# Patient Record
Sex: MALE | Race: WHITE | NOT HISPANIC OR LATINO | Employment: FULL TIME | URBAN - METROPOLITAN AREA
[De-identification: names, ages, dates, MRNs, and addresses within clinical notes are randomized per-mention and may not be internally consistent; named-entity substitution may affect disease eponyms.]

---

## 2022-07-29 ENCOUNTER — LAB REQUISITION (OUTPATIENT)
Dept: LAB | Facility: HOSPITAL | Age: 32
End: 2022-07-29

## 2022-07-29 DIAGNOSIS — Z00.00 ENCOUNTER FOR GENERAL ADULT MEDICAL EXAMINATION WITHOUT ABNORMAL FINDINGS: ICD-10-CM

## 2022-07-29 PROCEDURE — 87070 CULTURE OTHR SPECIMN AEROBIC: CPT | Performed by: OTOLARYNGOLOGY

## 2022-07-29 PROCEDURE — 87076 CULTURE ANAEROBE IDENT EACH: CPT | Performed by: OTOLARYNGOLOGY

## 2022-07-29 PROCEDURE — 87185 SC STD ENZYME DETCJ PER NZM: CPT | Performed by: OTOLARYNGOLOGY

## 2022-07-29 PROCEDURE — 87205 SMEAR GRAM STAIN: CPT | Performed by: OTOLARYNGOLOGY

## 2022-07-31 LAB
BACTERIA SPEC AEROBE CULT: ABNORMAL
BACTERIA SPEC AEROBE CULT: ABNORMAL
GRAM STN SPEC: ABNORMAL
GRAM STN SPEC: ABNORMAL

## 2022-08-29 ENCOUNTER — LAB REQUISITION (OUTPATIENT)
Dept: LAB | Facility: HOSPITAL | Age: 32
End: 2022-08-29

## 2022-08-29 DIAGNOSIS — H66.3X1 OTHER CHRONIC SUPPURATIVE OTITIS MEDIA, RIGHT EAR: ICD-10-CM

## 2022-08-29 PROCEDURE — 87070 CULTURE OTHR SPECIMN AEROBIC: CPT | Performed by: OTOLARYNGOLOGY

## 2022-08-29 PROCEDURE — 87205 SMEAR GRAM STAIN: CPT | Performed by: OTOLARYNGOLOGY

## 2022-08-31 LAB
BACTERIA SPEC AEROBE CULT: NORMAL
GRAM STN SPEC: NORMAL

## 2023-11-22 ENCOUNTER — LAB REQUISITION (OUTPATIENT)
Dept: LAB | Facility: HOSPITAL | Age: 33
End: 2023-11-22
Payer: COMMERCIAL

## 2023-11-22 DIAGNOSIS — H66.3X1 OTHER CHRONIC SUPPURATIVE OTITIS MEDIA, RIGHT EAR: ICD-10-CM

## 2023-11-22 PROCEDURE — 87070 CULTURE OTHR SPECIMN AEROBIC: CPT | Performed by: OTOLARYNGOLOGY

## 2023-11-22 PROCEDURE — 87205 SMEAR GRAM STAIN: CPT | Performed by: OTOLARYNGOLOGY

## 2023-11-25 LAB
BACTERIA SPEC AEROBE CULT: NORMAL
GRAM STN SPEC: NORMAL
GRAM STN SPEC: NORMAL

## 2024-03-05 ENCOUNTER — OFFICE VISIT (OUTPATIENT)
Dept: ORTHOPEDIC SURGERY | Facility: CLINIC | Age: 34
End: 2024-03-05
Payer: COMMERCIAL

## 2024-03-05 VITALS
HEART RATE: 88 BPM | HEIGHT: 74 IN | SYSTOLIC BLOOD PRESSURE: 145 MMHG | BODY MASS INDEX: 33.37 KG/M2 | WEIGHT: 260 LBS | DIASTOLIC BLOOD PRESSURE: 88 MMHG

## 2024-03-05 DIAGNOSIS — S46.212A RUPTURE OF LEFT DISTAL BICEPS TENDON, INITIAL ENCOUNTER: Primary | ICD-10-CM

## 2024-03-05 PROCEDURE — 99203 OFFICE O/P NEW LOW 30 MIN: CPT | Performed by: NURSE PRACTITIONER

## 2024-03-05 NOTE — PROGRESS NOTES
"Subjective:     Patient ID: Sebastian Brito is a 33 y.o. male.    Chief Complaint:  Left elbow injury, new patient to examiner  History of Present Illness  Sebastian Brito is a 33-year-old male who presents to clinic today for evaluation of his left upper extremity.    He is right hand dominant, but he was moving items in his storage over the weekend when he applied pressure to the forearm, tried to lift up, felt a pop, and noticed the biceps muscle retracted proximally. He rates his discomfort as a 7 out of a 10, throbbing, and shooting in nature. He did present to urgent care. He is currently utilizing a sling, has taken some Tylenol as well as use of ice. He denies any prior injury to the left upper extremity in the past. 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 He is also experiencing some pain in his shoulder, which was present prior to the biceps. He denies any other concerns present.       Social History     Occupational History    Not on file   Tobacco Use    Smoking status: Never     Passive exposure: Past    Smokeless tobacco: Current     Types: Chew   Vaping Use    Vaping status: Never Used   Substance and Sexual Activity    Alcohol use: Yes     Comment: occ    Drug use: Never    Sexual activity: Yes      History reviewed. No pertinent past medical history.  History reviewed. No pertinent surgical history.    History reviewed. No pertinent family history.            Objective:  Physical Exam    Vital signs reviewed.   General: No acute distress.  Eyes: conjunctiva clear; pupils equally round and reactive  ENT: external ears and nose atraumatic; oropharynx clear  CV: no peripheral edema  Resp: normal respiratory effort  Skin: no rashes or wounds; normal turgor  Psych: mood and affect appropriate; recent and remote memory intact    Vitals:    03/05/24 1112   BP: 145/88   Pulse: 88   Weight: 118 kg (260 lb)   Height: 188 cm (74\")         03/05/24  1112   Weight: 118 kg (260 lb)     Body mass " index is 33.38 kg/m².      Left Elbow Exam     Comments:  Positive hook test left upper extremity  Positive sensation light touch all distributions left upper extremity  Negative Tinel's at cubital tunnel  Flex/extend all digits left hand  2+ distal radius pulse   strength 5 out of 5                 Imaging:        Independently reviewed 2 view x-ray imaging right elbow osteophyte triceps tendon insertion no evidence of acute fracture dislocation or other acute osseous abnormality    Assessment:        1. Rupture of left distal biceps tendon, initial encounter           Plan:  1. We discussed plan of care with the patient. We will proceed with MRI for surgical planning. We will plan to call with results and further plan of care. He will continue with sling, continue with over-the-counter analgesics, continue with ice. He will continue with finger range of motion. He was encouraged to call with any questions or concerns. All questions answered.  Orders:  Orders Placed This Encounter   Procedures    MRI Elbow Left Without Contrast     No orders of the defined types were placed in this encounter.          I ordered and reviewed the DONYA today.       Dragon dictation utilized    Transcribed from ambient dictation for KAMILA Vega by Yesys Vasquez.  03/05/24   11:46 EST    Patient or patient representative verbalized consent to the visit recording.  I have personally performed the services described in this document as transcribed by the above individual, and it is both accurate and complete.

## 2024-03-14 ENCOUNTER — TELEPHONE (OUTPATIENT)
Dept: ORTHOPEDIC SURGERY | Facility: CLINIC | Age: 34
End: 2024-03-14
Payer: COMMERCIAL

## 2024-03-18 ENCOUNTER — OFFICE VISIT (OUTPATIENT)
Dept: ORTHOPEDIC SURGERY | Facility: CLINIC | Age: 34
End: 2024-03-18
Payer: COMMERCIAL

## 2024-03-18 VITALS
HEART RATE: 91 BPM | WEIGHT: 260 LBS | HEIGHT: 74 IN | DIASTOLIC BLOOD PRESSURE: 86 MMHG | SYSTOLIC BLOOD PRESSURE: 131 MMHG | BODY MASS INDEX: 33.37 KG/M2

## 2024-03-18 DIAGNOSIS — S46.212A RUPTURE OF LEFT DISTAL BICEPS TENDON, INITIAL ENCOUNTER: Primary | ICD-10-CM

## 2024-03-18 PROCEDURE — 99214 OFFICE O/P EST MOD 30 MIN: CPT | Performed by: ORTHOPAEDIC SURGERY

## 2024-03-18 NOTE — PROGRESS NOTES
Subjective:     Patient ID: Sebastian Brito is a 33 y.o. male.    Chief Complaint:  Left elbow pain, new patient to examiner     History of Present Illness  Sebastian Brito presents for evaluation of left elbow pain. He is accompanied by an adult female.     This occurred on 03/03/2024 when he was moving some items out of a storage facility, felt a sharp pain to his anterior upper extremity and forearm on the left side, noted a pop, and felt some retraction of his biceps muscle on that side. He was seen by KAMILA Arellano. An MRI was ordered. He was noted to have complete distal biceps rupture. His pain has been fairly moderate in intensity since then, which he rates as a 6 to 7 out of 10, aching in nature, occasional sharp pain. He does note associated weakness with rotational activities as well as bending his elbow. He has been using a sling to help with pain control as well as stability. He denies any fevers, chills, or sweats. He denies any prior issues with his elbow. He has had some intermittent tingling and numbness extending down into his lateral forearm. He has no weakness in his hands.       Social History     Occupational History    Not on file   Tobacco Use    Smoking status: Former     Types: Cigarettes     Passive exposure: Past    Smokeless tobacco: Current     Types: Chew    Tobacco comments:     Nicotine pouches - previously used dip   Vaping Use    Vaping status: Never Used   Substance and Sexual Activity    Alcohol use: Yes     Alcohol/week: 1.0 - 2.0 standard drink of alcohol     Types: 1 - 2 Shots of liquor per week     Comment: occ    Drug use: Never    Sexual activity: Yes     Partners: Female     Birth control/protection: None      History reviewed. No pertinent past medical history.  No past surgical history on file.    Family History   Problem Relation Age of Onset    Anesthesia problems Mother         sensitivity to succinylcholine         Review of Systems        Objective:  Vitals:     "03/18/24 1328   BP: 131/86   Pulse: 91   Weight: 118 kg (260 lb)   Height: 188 cm (74\")         03/18/24  1328   Weight: 118 kg (260 lb)     Body mass index is 33.38 kg/m².  Physical Exam    Vital signs reviewed.   General: No acute distress, alert and oriented  Eyes: conjunctiva clear; pupils equally round and reactive  ENT: external ears and nose atraumatic; oropharynx clear  CV: no peripheral edema  Resp: normal respiratory effort  Skin: no rashes or wounds; normal turgor  Psych: mood and affect appropriate; recent and remote memory intact        Ortho Exam     Left elbow-  Active range of motion is 5 to 110 degrees, 4 out of 5 strength on flexion, 4+ out of 5 strength on extension, 4- out of 5 strength on resisted supination, 4 plus out of 5 on resisted pronation both to 60 degrees, limited secondary to pain and swelling. Moderate swelling and ecchymosis noted in the antecubital fossa. 1+ radial pulse, left wrist. Positive sensation to light touch in all distributions, left hand symmetric to the right.  Imaging:              Review of outside MRI left elbow as well as x-ray left elbow including review of imaging as well as radiology report indicates distal biceps rupture with retraction of greater than 5 cm.    Assessment:        1. Rupture of left distal biceps tendon, initial encounter           Plan:        1. Discussed treatment options at length with patient at today's visit.   2. I discussed treatment options with the patient. Given his full thickness rupture of his distal biceps tendon, associated weakness and pain, and desire to return to rotational activities with his forearm, we discussed options. He would like to proceed with surgical treatment at this time.   3. Discussed treatment options for patient- wishes to proceed with left distal biceps repair versus reconstruction. We reviewed risks, benefits, and alternatives of surgery with risks including but not limited to neurovascular damage, bleeding, " infection, re-rupture of tendon, failure of healing of tendon, elbow contracture, loss of motion, weakness, stiffness, DVT pulmonary embolus, death, stroke, myocardial infarction, complex regional pain syndrome, and need for additional procedures. Patient understood all these, had all questions answered, and verbally consented to proceeding with surgery. We also discussed typical post-op rehab course and that it may take 6-12 months before obtaining maximal improvement. No guarantees were given in regards to the procedure.  4. The patient denies history of DVT or pulmonary embolus, denies cardiac history, and he is not diabetic.       Sebastian Brito was in agreement with plan and had all questions answered.     Orders:  No orders of the defined types were placed in this encounter.      Medications:  No orders of the defined types were placed in this encounter.      Followup:  No follow-ups on file.    Diagnoses and all orders for this visit:    1. Rupture of left distal biceps tendon, initial encounter (Primary)        Sebastian Brito  reports that he has quit smoking. His smoking use included cigarettes. He has been exposed to tobacco smoke. His smokeless tobacco use includes chew. I have educated him on the risk of diseases from using tobacco products such as cancer, COPD, and heart disease.   I spent 4 minutes counseling the patient.        Dictated utilizing Dragon dictation     Transcribed from ambient dictation for Armando Bang MD by Yessy Vasquez.  03/18/24   14:52 EDT    Patient or patient representative verbalized consent to the visit recording.  I have personally performed the services described in this document as transcribed by the above individual, and it is both accurate and complete.

## 2024-03-19 PROBLEM — S46.212A RUPTURE OF LEFT DISTAL BICEPS TENDON: Status: ACTIVE | Noted: 2024-03-18

## 2024-03-19 RX ORDER — ACETAMINOPHEN 325 MG/1
1000 TABLET ORAL ONCE
Status: CANCELLED | OUTPATIENT
Start: 2024-03-19 | End: 2024-03-19

## 2024-03-19 RX ORDER — MELOXICAM 7.5 MG/1
15 TABLET ORAL ONCE
Status: CANCELLED | OUTPATIENT
Start: 2024-03-19 | End: 2024-03-19

## 2024-03-19 RX ORDER — PREGABALIN 75 MG/1
75 CAPSULE ORAL ONCE
Status: CANCELLED | OUTPATIENT
Start: 2024-03-19 | End: 2024-03-19

## 2024-03-20 ENCOUNTER — PRE-ADMISSION TESTING (OUTPATIENT)
Dept: PREADMISSION TESTING | Facility: HOSPITAL | Age: 34
End: 2024-03-20
Payer: COMMERCIAL

## 2024-03-20 VITALS
BODY MASS INDEX: 34.91 KG/M2 | DIASTOLIC BLOOD PRESSURE: 88 MMHG | RESPIRATION RATE: 20 BRPM | WEIGHT: 272 LBS | OXYGEN SATURATION: 99 % | HEART RATE: 99 BPM | SYSTOLIC BLOOD PRESSURE: 138 MMHG | HEIGHT: 74 IN

## 2024-03-20 DIAGNOSIS — S46.212A RUPTURE OF LEFT DISTAL BICEPS TENDON, INITIAL ENCOUNTER: ICD-10-CM

## 2024-03-20 LAB
ANION GAP SERPL CALCULATED.3IONS-SCNC: 10.6 MMOL/L (ref 5–15)
APTT PPP: 28.7 SECONDS (ref 24.3–38.1)
BASOPHILS # BLD AUTO: 0.04 10*3/MM3 (ref 0–0.2)
BASOPHILS NFR BLD AUTO: 0.5 % (ref 0–1.5)
BUN SERPL-MCNC: 18 MG/DL (ref 6–20)
BUN/CREAT SERPL: 17.8 (ref 7–25)
CALCIUM SPEC-SCNC: 9.5 MG/DL (ref 8.6–10.5)
CHLORIDE SERPL-SCNC: 100 MMOL/L (ref 98–107)
CO2 SERPL-SCNC: 23.4 MMOL/L (ref 22–29)
CREAT SERPL-MCNC: 1.01 MG/DL (ref 0.76–1.27)
DEPRECATED RDW RBC AUTO: 37.4 FL (ref 37–54)
EGFRCR SERPLBLD CKD-EPI 2021: 100.1 ML/MIN/1.73
EOSINOPHIL # BLD AUTO: 0.17 10*3/MM3 (ref 0–0.4)
EOSINOPHIL NFR BLD AUTO: 2.2 % (ref 0.3–6.2)
ERYTHROCYTE [DISTWIDTH] IN BLOOD BY AUTOMATED COUNT: 12.1 % (ref 12.3–15.4)
GLUCOSE SERPL-MCNC: 102 MG/DL (ref 65–99)
HBA1C MFR BLD: 4.8 % (ref 4.8–5.6)
HCT VFR BLD AUTO: 46.8 % (ref 37.5–51)
HGB BLD-MCNC: 16.2 G/DL (ref 13–17.7)
IMM GRANULOCYTES # BLD AUTO: 0.03 10*3/MM3 (ref 0–0.05)
IMM GRANULOCYTES NFR BLD AUTO: 0.4 % (ref 0–0.5)
INR PPP: 1 (ref 0.9–1.1)
LYMPHOCYTES # BLD AUTO: 1.79 10*3/MM3 (ref 0.7–3.1)
LYMPHOCYTES NFR BLD AUTO: 22.9 % (ref 19.6–45.3)
MCH RBC QN AUTO: 29.7 PG (ref 26.6–33)
MCHC RBC AUTO-ENTMCNC: 34.6 G/DL (ref 31.5–35.7)
MCV RBC AUTO: 85.7 FL (ref 79–97)
MONOCYTES # BLD AUTO: 0.87 10*3/MM3 (ref 0.1–0.9)
MONOCYTES NFR BLD AUTO: 11.1 % (ref 5–12)
NEUTROPHILS NFR BLD AUTO: 4.92 10*3/MM3 (ref 1.7–7)
NEUTROPHILS NFR BLD AUTO: 62.9 % (ref 42.7–76)
NRBC BLD AUTO-RTO: 0 /100 WBC (ref 0–0.2)
PLATELET # BLD AUTO: 250 10*3/MM3 (ref 140–450)
PMV BLD AUTO: 9.5 FL (ref 6–12)
POTASSIUM SERPL-SCNC: 3.8 MMOL/L (ref 3.5–5.2)
PROTHROMBIN TIME: 13.2 SECONDS (ref 12.1–15)
RBC # BLD AUTO: 5.46 10*6/MM3 (ref 4.14–5.8)
SODIUM SERPL-SCNC: 134 MMOL/L (ref 136–145)
WBC NRBC COR # BLD AUTO: 7.82 10*3/MM3 (ref 3.4–10.8)

## 2024-03-20 PROCEDURE — 36415 COLL VENOUS BLD VENIPUNCTURE: CPT

## 2024-03-20 PROCEDURE — 85730 THROMBOPLASTIN TIME PARTIAL: CPT | Performed by: ORTHOPAEDIC SURGERY

## 2024-03-20 PROCEDURE — 83036 HEMOGLOBIN GLYCOSYLATED A1C: CPT | Performed by: ORTHOPAEDIC SURGERY

## 2024-03-20 PROCEDURE — 85610 PROTHROMBIN TIME: CPT | Performed by: ORTHOPAEDIC SURGERY

## 2024-03-20 PROCEDURE — 85025 COMPLETE CBC W/AUTO DIFF WBC: CPT | Performed by: ORTHOPAEDIC SURGERY

## 2024-03-20 PROCEDURE — 80048 BASIC METABOLIC PNL TOTAL CA: CPT | Performed by: ORTHOPAEDIC SURGERY

## 2024-03-20 NOTE — DISCHARGE INSTRUCTIONS
PRE-ADMISSION TESTING INSTRUCTIONS FOR ADULTS    Take these medications the morning of surgery with a small sip of water: nothing       Do not take any insulin or diabetes medications the morning of surgery.      No aspirin, advil, aleve, ibuprofen, naproxen, diet pills, decongestants, or herbal/vitamins for a week prior to surgery.       Tylenol/Acetaminophen is okay to take if needed.    General Instructions:    DO NOT EAT SOLID FOOD AFTER MIDNIGHT THE NIGHT BEFORE SURGERY. No gum, mints, or hard candy after midnight the night before surgery.  You may drink clear liquids the day of surgery up until 2 hours before your arrival time.  Clear liquids are liquids you can see through. Nothing RED in color.    Plain water    Sports drinks      Gelatin (Jell-O)  Fruit juices without pulp such as white grape juice and apple juice  Popsicles that contain no fruit or yogurt  Tea or coffee (no cream or milk added)    It is beneficial for you to have a clear drink that contains carbohydrates 2 hours before your arrival time.  We suggest a 20 ounce bottle of Gatorade or Powerade for non-diabetic patients or a 20 ounce bottle of Gatorade Zero or Powerade Zero for diabetic patients.     Patients who avoid smoking, chewing tobacco and alcohol for 4 weeks prior to surgery have a reduced risk of post-operative complications.  If at all possible, quit smoking as many days before surgery as you can.    Do not smoke, use chewing tobacco or drink alcohol the day of surgery    Bring your C-PAP/ BI-PAP machine if you use one.  Wear clean comfortable clothes.  Do not wear contact lenses, lotion, deodorant, or make-up.  Bring a case for your glasses if applicable. You may brush your teeth the morning of surgery.  You may wear dentures/partials, do not put adhesive/glue on them.  Leave all other jewelry and valuables at home.      Preventing a Surgical Site Infection:    Shower the night before and on the morning of surgery using the  chlorhexidine soap you were given.  Use a clean washcloth with the soap.  Place clean sheets on your bed after showering the night before surgery. Do not use the CHG soap on your hair, face, or private areas. Wash your body gently for five (5) minutes. Do not scrub your skin.  Dry with a clean towel and dress in clean clothing.  Do not shave the surgical area for 10 days-2 weeks prior to surgery  because the razor can irritate skin and make it easier to develop an infection.  Make sure you, your family, and all healthcare providers clean their hands with soap and water or an alcohol based hand  before caring for you or your wound.      Day of surgery:    Your surgeon’s office will advise you of your arrival time for the day of surgery.    Upon arrival, a Pre-op nurse and Anesthesia provider will review your health history, obtain vital signs, and answer questions you may have. The anesthesia provider will also discuss the type of anesthesia that will be needed for your procedure, which may include general anesthesia. The only belongings needed at this time will be your home medications and if applicable your C-PAP/BI-PAP machine.  If you are staying overnight your family can leave the rest of your belongings in the car and bring them to your room later.  A Pre-op nurse will start an IV and you may receive medication in preparation for surgery, including something to help you relax.  Your family will be able to see you in the Pre-op area.  While you are in surgery your family should notify the waiting room  if they leave the waiting room area and provide a contact phone number.    IF you have any questions, you can call the Pre-Admission Department at [phone] or your surgeon's office.  Notify your surgeon if  you become sick, have a fever, productive cough, or cannot be here the day of surgery    Please be aware that surgery does come with discomfort.  We want to make every effort to  control your discomfort so please discuss any uncontrolled symptoms with your nurse.   Your doctor will most likely have prescribed pain medications.      If you are going home after surgery, you will receive individualized written care instructions before being discharged.  A responsible adult (over the age of 18) must drive you to and from the hospital on the day of your surgery and stay with you for 24 hours after anesthesia.    If you are staying overnight following surgery, you will be transported to your hospital room following the recovery period.  [facility] has all private rooms.    You may receive a survey regarding the care you received. Your feedback is very important and will be used to collect the necessary data to help us to continue to provide excellent care.     Deductibles and co-payments are collected on the day of service. Please be prepared to pay the required co-pay, deductible or deposit on the day of service as defined by your plan.

## 2024-03-21 ENCOUNTER — ANESTHESIA EVENT (OUTPATIENT)
Dept: PERIOP | Facility: HOSPITAL | Age: 34
End: 2024-03-21
Payer: COMMERCIAL

## 2024-03-22 ENCOUNTER — APPOINTMENT (OUTPATIENT)
Dept: GENERAL RADIOLOGY | Facility: HOSPITAL | Age: 34
End: 2024-03-22
Payer: COMMERCIAL

## 2024-03-22 ENCOUNTER — HOSPITAL ENCOUNTER (OUTPATIENT)
Facility: HOSPITAL | Age: 34
Setting detail: HOSPITAL OUTPATIENT SURGERY
Discharge: HOME | End: 2024-03-22
Attending: ORTHOPAEDIC SURGERY | Admitting: ORTHOPAEDIC SURGERY
Payer: COMMERCIAL

## 2024-03-22 ENCOUNTER — ANESTHESIA (OUTPATIENT)
Dept: PERIOP | Facility: HOSPITAL | Age: 34
End: 2024-03-22
Payer: COMMERCIAL

## 2024-03-22 VITALS
RESPIRATION RATE: 12 BRPM | WEIGHT: 268.2 LBS | BODY MASS INDEX: 34.43 KG/M2 | SYSTOLIC BLOOD PRESSURE: 144 MMHG | HEART RATE: 98 BPM | OXYGEN SATURATION: 94 % | TEMPERATURE: 98.2 F | DIASTOLIC BLOOD PRESSURE: 92 MMHG

## 2024-03-22 DIAGNOSIS — S46.212A RUPTURE OF LEFT DISTAL BICEPS TENDON, INITIAL ENCOUNTER: ICD-10-CM

## 2024-03-22 LAB
ABO GROUP BLD: NORMAL
ABO GROUP BLD: NORMAL
BLD GP AB SCN SERPL QL: NEGATIVE
RH BLD: POSITIVE
RH BLD: POSITIVE
T&S EXPIRATION DATE: NORMAL

## 2024-03-22 PROCEDURE — 86901 BLOOD TYPING SEROLOGIC RH(D): CPT | Performed by: ORTHOPAEDIC SURGERY

## 2024-03-22 PROCEDURE — 73070 X-RAY EXAM OF ELBOW: CPT

## 2024-03-22 PROCEDURE — 86901 BLOOD TYPING SEROLOGIC RH(D): CPT

## 2024-03-22 PROCEDURE — 24340 TENODESIS BICEPS TDN AT ELBW: CPT | Performed by: ORTHOPAEDIC SURGERY

## 2024-03-22 PROCEDURE — 24340 TENODESIS BICEPS TDN AT ELBW: CPT | Performed by: SPECIALIST/TECHNOLOGIST, OTHER

## 2024-03-22 PROCEDURE — 86900 BLOOD TYPING SEROLOGIC ABO: CPT | Performed by: ORTHOPAEDIC SURGERY

## 2024-03-22 PROCEDURE — C1713 ANCHOR/SCREW BN/BN,TIS/BN: HCPCS | Performed by: ORTHOPAEDIC SURGERY

## 2024-03-22 PROCEDURE — 25010000002 CEFAZOLIN 3 G RECONSTITUTED SOLUTION 1 EACH VIAL: Performed by: ORTHOPAEDIC SURGERY

## 2024-03-22 PROCEDURE — 86900 BLOOD TYPING SEROLOGIC ABO: CPT

## 2024-03-22 PROCEDURE — 25010000002 MIDAZOLAM PER 1MG

## 2024-03-22 PROCEDURE — 25010000002 BUPIVACAINE (PF) 0.25 % SOLUTION

## 2024-03-22 PROCEDURE — 76000 FLUOROSCOPY <1 HR PHYS/QHP: CPT

## 2024-03-22 PROCEDURE — 25010000002 PROPOFOL 10 MG/ML EMULSION: Performed by: NURSE ANESTHETIST, CERTIFIED REGISTERED

## 2024-03-22 PROCEDURE — 25010000002 FENTANYL CITRATE (PF) 50 MCG/ML SOLUTION: Performed by: NURSE ANESTHETIST, CERTIFIED REGISTERED

## 2024-03-22 PROCEDURE — 25010000002 DEXAMETHASONE PER 1 MG

## 2024-03-22 PROCEDURE — 86850 RBC ANTIBODY SCREEN: CPT | Performed by: ORTHOPAEDIC SURGERY

## 2024-03-22 PROCEDURE — 25810000003 LACTATED RINGERS PER 1000 ML

## 2024-03-22 PROCEDURE — 25010000002 ONDANSETRON PER 1 MG

## 2024-03-22 DEVICE — DEV CONTRL TISS STRATAFIX SPIRAL MNCRYL UD 3/0 PLS 45CM: Type: IMPLANTABLE DEVICE | Site: ARM | Status: FUNCTIONAL

## 2024-03-22 DEVICE — KT BUTN REPR SHLDR DIST BIOCOMP: Type: IMPLANTABLE DEVICE | Site: ARM | Status: FUNCTIONAL

## 2024-03-22 RX ORDER — TRANEXAMIC ACID 10 MG/ML
1000 INJECTION, SOLUTION INTRAVENOUS ONCE
Status: DISCONTINUED | OUTPATIENT
Start: 2024-03-22 | End: 2024-03-22 | Stop reason: HOSPADM

## 2024-03-22 RX ORDER — SODIUM CHLORIDE, SODIUM LACTATE, POTASSIUM CHLORIDE, CALCIUM CHLORIDE 600; 310; 30; 20 MG/100ML; MG/100ML; MG/100ML; MG/100ML
9 INJECTION, SOLUTION INTRAVENOUS CONTINUOUS
Status: DISCONTINUED | OUTPATIENT
Start: 2024-03-22 | End: 2024-03-22 | Stop reason: HOSPADM

## 2024-03-22 RX ORDER — LIDOCAINE HYDROCHLORIDE 10 MG/ML
0.5 INJECTION, SOLUTION INFILTRATION; PERINEURAL ONCE AS NEEDED
Status: DISCONTINUED | OUTPATIENT
Start: 2024-03-22 | End: 2024-03-22 | Stop reason: HOSPADM

## 2024-03-22 RX ORDER — FAMOTIDINE 10 MG/ML
20 INJECTION, SOLUTION INTRAVENOUS
Status: COMPLETED | OUTPATIENT
Start: 2024-03-22 | End: 2024-03-22

## 2024-03-22 RX ORDER — ONDANSETRON 2 MG/ML
4 INJECTION INTRAMUSCULAR; INTRAVENOUS ONCE AS NEEDED
Status: DISCONTINUED | OUTPATIENT
Start: 2024-03-22 | End: 2024-03-22 | Stop reason: HOSPADM

## 2024-03-22 RX ORDER — SODIUM CHLORIDE 0.9 % (FLUSH) 0.9 %
10 SYRINGE (ML) INJECTION EVERY 12 HOURS SCHEDULED
Status: DISCONTINUED | OUTPATIENT
Start: 2024-03-22 | End: 2024-03-22 | Stop reason: HOSPADM

## 2024-03-22 RX ORDER — MELOXICAM 7.5 MG/1
15 TABLET ORAL ONCE
Status: COMPLETED | OUTPATIENT
Start: 2024-03-22 | End: 2024-03-22

## 2024-03-22 RX ORDER — SODIUM CHLORIDE 9 MG/ML
40 INJECTION, SOLUTION INTRAVENOUS AS NEEDED
Status: DISCONTINUED | OUTPATIENT
Start: 2024-03-22 | End: 2024-03-22 | Stop reason: HOSPADM

## 2024-03-22 RX ORDER — PREGABALIN 75 MG/1
75 CAPSULE ORAL ONCE
Status: COMPLETED | OUTPATIENT
Start: 2024-03-22 | End: 2024-03-22

## 2024-03-22 RX ORDER — DEXMEDETOMIDINE HYDROCHLORIDE 100 UG/ML
INJECTION, SOLUTION INTRAVENOUS AS NEEDED
Status: DISCONTINUED | OUTPATIENT
Start: 2024-03-22 | End: 2024-03-22 | Stop reason: SURG

## 2024-03-22 RX ORDER — FENTANYL CITRATE 50 UG/ML
50 INJECTION, SOLUTION INTRAMUSCULAR; INTRAVENOUS
Status: DISCONTINUED | OUTPATIENT
Start: 2024-03-22 | End: 2024-03-22 | Stop reason: HOSPADM

## 2024-03-22 RX ORDER — SODIUM CHLORIDE 0.9 % (FLUSH) 0.9 %
10 SYRINGE (ML) INJECTION AS NEEDED
Status: DISCONTINUED | OUTPATIENT
Start: 2024-03-22 | End: 2024-03-22 | Stop reason: HOSPADM

## 2024-03-22 RX ORDER — OXYCODONE HYDROCHLORIDE AND ACETAMINOPHEN 5; 325 MG/1; MG/1
1 TABLET ORAL EVERY 6 HOURS PRN
Qty: 30 TABLET | Refills: 0 | Status: SHIPPED | OUTPATIENT
Start: 2024-03-22

## 2024-03-22 RX ORDER — ONDANSETRON 2 MG/ML
4 INJECTION INTRAMUSCULAR; INTRAVENOUS ONCE AS NEEDED
Status: COMPLETED | OUTPATIENT
Start: 2024-03-22 | End: 2024-03-22

## 2024-03-22 RX ORDER — MIDAZOLAM HYDROCHLORIDE 2 MG/2ML
1 INJECTION, SOLUTION INTRAMUSCULAR; INTRAVENOUS
Status: COMPLETED | OUTPATIENT
Start: 2024-03-22 | End: 2024-03-22

## 2024-03-22 RX ORDER — SODIUM CHLORIDE, SODIUM LACTATE, POTASSIUM CHLORIDE, CALCIUM CHLORIDE 600; 310; 30; 20 MG/100ML; MG/100ML; MG/100ML; MG/100ML
100 INJECTION, SOLUTION INTRAVENOUS ONCE
Status: DISCONTINUED | OUTPATIENT
Start: 2024-03-22 | End: 2024-03-22 | Stop reason: HOSPADM

## 2024-03-22 RX ORDER — DEXAMETHASONE SODIUM PHOSPHATE 4 MG/ML
8 INJECTION, SOLUTION INTRA-ARTICULAR; INTRALESIONAL; INTRAMUSCULAR; INTRAVENOUS; SOFT TISSUE ONCE AS NEEDED
Status: COMPLETED | OUTPATIENT
Start: 2024-03-22 | End: 2024-03-22

## 2024-03-22 RX ORDER — ONDANSETRON 4 MG/1
4 TABLET, FILM COATED ORAL EVERY 8 HOURS PRN
Qty: 20 TABLET | Refills: 0 | Status: SHIPPED | OUTPATIENT
Start: 2024-03-22

## 2024-03-22 RX ORDER — LIDOCAINE HYDROCHLORIDE 20 MG/ML
INJECTION, SOLUTION INFILTRATION; PERINEURAL AS NEEDED
Status: DISCONTINUED | OUTPATIENT
Start: 2024-03-22 | End: 2024-03-22 | Stop reason: SURG

## 2024-03-22 RX ORDER — ACETAMINOPHEN 500 MG
1000 TABLET ORAL ONCE
Status: COMPLETED | OUTPATIENT
Start: 2024-03-22 | End: 2024-03-22

## 2024-03-22 RX ORDER — BUPIVACAINE HYDROCHLORIDE 2.5 MG/ML
INJECTION, SOLUTION EPIDURAL; INFILTRATION; INTRACAUDAL
Status: COMPLETED | OUTPATIENT
Start: 2024-03-22 | End: 2024-03-22

## 2024-03-22 RX ADMIN — MELOXICAM 15 MG: 7.5 TABLET ORAL at 11:38

## 2024-03-22 RX ADMIN — LIDOCAINE HYDROCHLORIDE 100 MG: 20 INJECTION, SOLUTION INFILTRATION; PERINEURAL at 14:19

## 2024-03-22 RX ADMIN — SODIUM CHLORIDE 2 G: 9 INJECTION, SOLUTION INTRAVENOUS at 14:27

## 2024-03-22 RX ADMIN — PREGABALIN 75 MG: 75 CAPSULE ORAL at 11:38

## 2024-03-22 RX ADMIN — PROPOFOL 200 MCG/KG/MIN: 10 INJECTION, EMULSION INTRAVENOUS at 14:22

## 2024-03-22 RX ADMIN — FENTANYL CITRATE 50 MCG: 50 INJECTION, SOLUTION INTRAMUSCULAR; INTRAVENOUS at 16:22

## 2024-03-22 RX ADMIN — BUPIVACAINE HYDROCHLORIDE 22 ML: 2.5 INJECTION, SOLUTION EPIDURAL; INFILTRATION; INTRACAUDAL; PERINEURAL at 13:45

## 2024-03-22 RX ADMIN — MIDAZOLAM HYDROCHLORIDE 1 MG: 1 INJECTION, SOLUTION INTRAMUSCULAR; INTRAVENOUS at 13:28

## 2024-03-22 RX ADMIN — SODIUM CHLORIDE, POTASSIUM CHLORIDE, SODIUM LACTATE AND CALCIUM CHLORIDE: 600; 310; 30; 20 INJECTION, SOLUTION INTRAVENOUS at 14:15

## 2024-03-22 RX ADMIN — ACETAMINOPHEN 1000 MG: 500 TABLET ORAL at 11:38

## 2024-03-22 RX ADMIN — FAMOTIDINE 20 MG: 10 INJECTION, SOLUTION INTRAVENOUS at 11:41

## 2024-03-22 RX ADMIN — DEXMEDETOMIDINE 20 MCG: 100 INJECTION, SOLUTION, CONCENTRATE INTRAVENOUS at 13:45

## 2024-03-22 RX ADMIN — ONDANSETRON 4 MG: 2 INJECTION INTRAMUSCULAR; INTRAVENOUS at 11:41

## 2024-03-22 RX ADMIN — MIDAZOLAM HYDROCHLORIDE 1 MG: 1 INJECTION, SOLUTION INTRAMUSCULAR; INTRAVENOUS at 13:38

## 2024-03-22 RX ADMIN — DEXAMETHASONE SODIUM PHOSPHATE 8 MG: 4 INJECTION, SOLUTION INTRAMUSCULAR; INTRAVENOUS at 11:41

## 2024-03-22 NOTE — ANESTHESIA POSTPROCEDURE EVALUATION
Patient: Sebastian Brito    Procedure Summary       Date: 03/22/24 Room / Location:  [facility] /  [facility]    Anesthesia Start: 1415 Anesthesia Stop: 1602    Procedure: TENDON DISTAL BICEPS REPAIR/RELEASE (Left: Arm Upper) Diagnosis:       Rupture of left distal biceps tendon, initial encounter      (Rupture of left distal biceps tendon, initial encounter [S46.212A])    Surgeons: Armando Bang MD Provider: Viky Damico CRNA    Anesthesia Type: general with block ASA Status: 2            Anesthesia Type: general with block    Vitals  Vitals Value Taken Time   /91 03/22/24 1630   Temp 98.2 °F (36.8 °C) 03/22/24 1600   Pulse 110 03/22/24 1631   Resp 14 03/22/24 1625   SpO2 94 % 03/22/24 1631   Vitals shown include unfiled device data.        Post Anesthesia Care and Evaluation    Patient location during evaluation: PHASE II  Patient participation: complete - patient participated  Level of consciousness: awake  Pain management: adequate    Airway patency: patent  Anesthetic complications: No anesthetic complications  PONV Status: none  Cardiovascular status: acceptable  Respiratory status: acceptable  Hydration status: acceptable

## 2024-03-22 NOTE — ANESTHESIA PREPROCEDURE EVALUATION
Anesthesia Evaluation     Patient summary reviewed and Nursing notes reviewed   no history of anesthetic complications:   NPO Solid Status: > 8 hours  NPO Liquid Status: > 2 hours           Airway   Mallampati: I  TM distance: >3 FB  Neck ROM: full  Possible difficult intubation  Comment: Cervical stenosis without limited movement c5-c7  Dental - normal exam     Comment: Fillings secure      Pulmonary - normal exam    breath sounds clear to auscultation  (+) ,sleep apnea (snores when sleeps)  (-) shortness of breath, recent URI, not a smoker  Cardiovascular   Exercise tolerance: good (4-7 METS)    Rhythm: regular  Rate: normal    (-) angina      Neuro/Psych  (+) numbness (bilateral arms with elevation s/t cervical stenosis)  GI/Hepatic/Renal/Endo    (+) obesity    Musculoskeletal     (+) neck pain (3/10), neck stiffness, radiculopathy Left upper extremity and Right upper extremity  Abdominal   (+) obese   Substance History   (+) alcohol use (1 drink a week)  (-) drug use     OB/GYN          Other                      Anesthesia Plan    ASA 2     general with block     intravenous induction     Anesthetic plan, risks, benefits, and alternatives have been provided, discussed and informed consent has been obtained with: patient and spouse/significant other.    Use of blood products discussed with patient and spouse/significant other  Consented to blood products.    Plan discussed with CRNA.    CODE STATUS:

## 2024-03-22 NOTE — OP NOTE
Date of Operation:  3/22/2024     PREOPERATIVE DIAGNOSIS: Left distal biceps tendon tear.     POSTOPERATIVE DIAGNOSIS: Left distal biceps tendon tear.      PROCEDURE PERFORMED: Left distal biceps tendon repair.      SURGEON: Armando Bang MD      ASSISTANT:  GOLDY Shen was responsible for performing the following activities: Retraction, Irrigation, Closing, and Placing Dressing and their skilled assistance was necessary for the success of this case.      ANESTHESIA: General endotracheal anesthesia with regional black.     ESTIMATED BLOOD LOSS: 20 ml.      URINE OUTPUT: Not recorded.      FLUIDS: Per anesthesia.      COMPLICATIONS: None.      SPECIMENS: None.      DRAINS: None.      IMPLANTS: Arthrex distal biceps tendon repair kit.      INDICATIONS: The patient is a pleasant 33 y.o. male with significant history of elbow injury. Subsequent MRI did indicate a distal biceps tendon rupture. I discussed treatment options with the patient at length, he wished to proceed with above-mentioned surgical procedure. I explained details of the procedure as well as risks, benefits, and alternatives as documented on history and physical. He had all questions answered prior to signing the operative consent form and agreed to proceed with surgery at this time. No guarantees were given in regards to results of the procedure.      DETAILS OF PROCEDURE: The patient was seen, evaluated, and cleared for surgery by anesthesia. Patient was met in the preoperative holding area and the operative site was marked, consent was reviewed, history and physical was updated, and preoperative labs were reviewed. A regional block was then placed per anesthesia. The patient was taken to the operating room and placed in a supine position on a regular OR table with the hand table to the operative side. After successful intubation per anesthesia, a nonsterile tourniquet was applied to the left upper extremity. The left arm was sterilely prepped  and draped in a standard fashion. All bony prominences were well padded. The patient was secured to table with a waist strap.      Formal timeout was completed including confirmation of history and physical, operative consent, surgical site, patient identification number, preoperative antibiotic administration. The left arm was then exsanguinated and the tourniquet inflated to 250 mmHg. The procedure was then begun with a longitudinal incision beginning at the distal antecubital fossa crease and extending in a lengthwise fashion distally in the palpated junction between the proximal radius and ulna. Incision was carried through skin only with a #15 blade followed by subcutaneous dissection with Metzenbaum scissors. Blunt dissection was then carried out proximally until the distal biceps tendon stump was identified in the distal arm and retrieved after blunt dissection around significant scar tissue that had formed around the site of the biceps tendon itself. Distal end of the biceps tendon was then whipstitched with an Arthrex loop suture in a standard fashion with a running locking stitch at this point in time. Needle was then cut from the suture strands and traction was pulled allowing for further release of adhesions from both superficial and deep surface of the biceps tendon at this time.      Attention was then turned to distal dissection with superficial veins as well as perforating vessels being coagulated with bipolar electrocautery. Radial tuberosity was identified by blunt palpation with blunt dissection carried out into this direction. Deep retractors were then placed at this point in time and the guide pin was then fired into the radial tuberosity with confirmed position noted under mini C-arm fluoroscopy at this time. Once the tendon was sized as a 7 mm tendon, a reamer was passed in unicortical fashion over the guide pin creating a bony socket at this time. The wound was thoroughly irrigated removing  all bony debris at this point in time in order prevent a synostosis. The biceps button was then assembled onto the strands from the distal end of the biceps tendon and biceps button was placed on its  handle and passed in bicortical fashion through the bony socket on the proximal radius at this time. Button was successfully deployed as confirmed under C-arm fluoroscopy and elbow was brought to approximately 90° of flexion while the sutures were pulled bringing the biceps tendon into the socket and docking it successfully at this time. SwiveLock anchor was then passed over one of the lead sutures and inserted into the socket achieving an interference fit at this point in time while the suture was then tied over the screw at this time. Extraneous strands of the suture were then cut short. Elbow was ranged to approximately 50° short of full extension with moderate tension noted on the biceps tendon and muscle belly at this point in time.      Final fluoroscopic images were taken confirming appropriate position of socket, as well as retained successful deployment of the biceps button. The wound was once again thoroughly irrigated with normal saline at this point in time. Attention was then turned to closure of the wound with 3-0 Vicryl for subcutaneous layer, and 3-0 Monocryl for running subcuticular closure followed by Steri-Strips, 4 x 4 gauze, ABD pad, Webril, 4 x 30 posterior splint, and Ace wrap.      At the end of the procedure all sponge counts were correct x2. The patient had brisk capillary refill to all digits of the left upper extremity. Compartments were soft and easily compressible at the end of the procedure.      DISPOSITION: The patient was extubated per anesthesia and taken to the recovery room in stable condition. He will be discharged home in the care of his family. Follow up in one week for wound check, splint removal and transition to hinged elbow brace and we will start on distal biceps  tendon repair protocol at that time. Results of the procedure were discussed immediately postoperatively with the patient's family and they had all questions answered at that time.

## 2024-03-22 NOTE — ANESTHESIA PROCEDURE NOTES
Airway  Urgency: elective    Date/Time: 3/22/2024 2:23 PM  End Time:3/22/2024 2:23 PM  Airway not difficult    General Information and Staff    Patient location during procedure: OR  CRNA/CAA: Viky Damico CRNA    Indications and Patient Condition    Preoxygenated: yes  MILS maintained throughout  Mask difficulty assessment: 0 - not attempted    Final Airway Details  Final airway type: supraglottic airway      Successful airway: unique  Size 4     Number of attempts at approach: 1  Assessment: lips, teeth, and gum same as pre-op and atraumatic intubation

## 2024-03-22 NOTE — H&P
Orthopedic H&P     Patient ID: Sebastian Brito is a 33 y.o. male.    Chief Complaint:  Left elbow pain, distal biceps rupture    History of Present Illness  Sebastian Brito presents for surgical treatment of left elbow pain and distal biceps rupture.     This occurred on 03/03/2024 when he was moving some items out of a storage facility, felt a sharp pain to his anterior upper extremity and forearm on the left side, noted a pop, and felt some retraction of his biceps muscle on that side. He was seen by KAMILA Arellano. An MRI was ordered. He was noted to have complete distal biceps rupture. His pain has been fairly moderate in intensity since then, which he rates as a 6 to 7 out of 10, aching in nature, occasional sharp pain. He does note associated weakness with rotational activities as well as bending his elbow. He has been using a sling to help with pain control as well as stability. He denies any fevers, chills, or sweats. He denies any prior issues with his elbow. He has had some intermittent tingling and numbness extending down into his lateral forearm. He has no weakness in his hands.    No current facility-administered medications on file prior to encounter.     Current Outpatient Medications on File Prior to Encounter   Medication Sig Dispense Refill    Testosterone Cypionate 200 MG/ML kit Inject  into the appropriate muscle as directed by prescriber 1 (One) Time Per Week.      [DISCONTINUED] testosterone (ANDROGEL) 50 MG/5GM (1%) gel gel Place 50 mg on the skin as directed by provider 1 (One) Time Per Week.         No Known Allergies         Social History     Occupational History    Not on file   Tobacco Use    Smoking status: Never     Passive exposure: Past    Smokeless tobacco: Current     Types: Chew    Tobacco comments:     Nicotine pouches - previously used dip   Vaping Use    Vaping status: Never Used   Substance and Sexual Activity    Alcohol use: Yes     Alcohol/week: 1.0 - 2.0 standard drink  of alcohol     Types: 1 - 2 Shots of liquor per week     Comment: occ    Drug use: Never    Sexual activity: Yes     Partners: Female     Birth control/protection: None      History reviewed. No pertinent past medical history.  Past Surgical History:   Procedure Laterality Date    INNER EAR SURGERY Bilateral     tubes       Family History   Problem Relation Age of Onset    Anesthesia problems Mother         sensitivity to succinylcholine         Review of Systems        Objective:  Vitals:    03/22/24 1106   BP: 144/91   Pulse: 80   Resp: 12   Temp: 97.7 °F (36.5 °C)   TempSrc: Oral   SpO2: 99%   Weight: 122 kg (268 lb 3.2 oz)         03/22/24  1106   Weight: 122 kg (268 lb 3.2 oz)     Body mass index is 34.43 kg/m².  Physical Exam    Vital signs reviewed.   General: No acute distress, alert and oriented  Eyes: conjunctiva clear; pupils equally round and reactive  ENT: external ears and nose atraumatic; oropharynx clear  CV: no peripheral edema  Resp: normal respiratory effort  Skin: no rashes or wounds; normal turgor  Psych: mood and affect appropriate; recent and remote memory intact  Debilities: none      Ortho Exam     Left elbow-  Active range of motion is 5 to 110 degrees, 4 out of 5 strength on flexion, 4+ out of 5 strength on extension, 4- out of 5 strength on resisted supination, 4 plus out of 5 on resisted pronation both to 60 degrees, limited secondary to pain and swelling. Moderate swelling and ecchymosis noted in the antecubital fossa. 1+ radial pulse, left wrist. Positive sensation to light touch in all distributions, left hand symmetric to the right.    Imaging:              Review of outside MRI left elbow as well as x-ray left elbow including review of imaging as well as radiology report indicates distal biceps rupture with retraction of greater than 5 cm.    Assessment:        1. Rupture of left distal biceps tendon, initial encounter           Plan:        1. Discussed treatment options at length  with patient. Given his full thickness rupture of his distal biceps tendon, associated weakness and pain, and desire to return to rotational activities with his forearm, we discussed options. He would like to proceed with surgical treatment at this time.   2. Discussed treatment options for patient- wishes to proceed with left distal biceps repair versus reconstruction. We reviewed risks, benefits, and alternatives of surgery with risks including but not limited to neurovascular damage, bleeding, infection, re-rupture of tendon, failure of healing of tendon, elbow contracture, loss of motion, weakness, stiffness, DVT pulmonary embolus, death, stroke, myocardial infarction, complex regional pain syndrome, and need for additional procedures. Patient understood all these, had all questions answered, and consented to proceeding with surgery. We also discussed typical post-op rehab course and that it may take 6-12 months before obtaining maximal improvement. No guarantees were given in regards to the procedure.  3. The patient denies history of DVT or pulmonary embolus, denies cardiac history, and he is not diabetic.       eSbastian Brito was in agreement with plan and had all questions answered.

## 2024-03-28 ENCOUNTER — OFFICE VISIT (OUTPATIENT)
Dept: ORTHOPEDIC SURGERY | Facility: CLINIC | Age: 34
End: 2024-03-28
Payer: COMMERCIAL

## 2024-03-28 VITALS — WEIGHT: 268 LBS | HEIGHT: 74 IN | BODY MASS INDEX: 34.39 KG/M2

## 2024-03-28 DIAGNOSIS — Z09 STATUS POST ORTHOPEDIC SURGERY, FOLLOW-UP EXAM: Primary | ICD-10-CM

## 2024-03-28 PROCEDURE — 99024 POSTOP FOLLOW-UP VISIT: CPT | Performed by: NURSE PRACTITIONER

## 2024-03-28 NOTE — PROGRESS NOTES
CC: s/p left distal biceps tendon repair, DOS 3/22/2024    Interval history: Sebastian Brito Presents to clinic today for evaluation of his left upper extremity.  He is approximately 1 week postop discontinue sling denies any presence of numbness or tingling at the left upper extremity.  He has continue with finger range of motion as tolerated.  Pain well-controlled.  Denies any other concerns present.    Exam:  Left upper extremity examined out of splint  Positive sensation light touch all distributions left upper extremity  Flex/extend all digits left hand  2+ distal radius pulse  Brisk cap refill all digits left hand  Positive sensation the palmar and dorsum of the hand including all digits  Skin clean dry and intact, dressing intact  Elbow flexion 50 degrees    Assessment: s/p left distal biceps repair     Plan:  1.  Discussed plan of care with patient and family.  I do recommend continue finger range of motion avoid any heavy lifting.  We will transfer him to a hinged elbow brace locked at 50 degrees for 4 to full flexion.  We will start outpatient physical therapy for motion and strength advancing per protocol.  Will plan to see him back in clinic in 3 weeks with repeat x-ray images left elbow at follow-up.  All questions answered.

## 2024-03-29 PROBLEM — Z09 STATUS POST ORTHOPEDIC SURGERY, FOLLOW-UP EXAM: Status: ACTIVE | Noted: 2024-03-29

## 2024-04-22 ENCOUNTER — OFFICE VISIT (OUTPATIENT)
Dept: ORTHOPEDIC SURGERY | Facility: CLINIC | Age: 34
End: 2024-04-22
Payer: COMMERCIAL

## 2024-04-22 VITALS — HEIGHT: 74 IN | BODY MASS INDEX: 34.39 KG/M2 | WEIGHT: 268 LBS

## 2024-04-22 DIAGNOSIS — Z09 STATUS POST ORTHOPEDIC SURGERY, FOLLOW-UP EXAM: Primary | ICD-10-CM

## 2024-04-22 PROCEDURE — 99024 POSTOP FOLLOW-UP VISIT: CPT | Performed by: ORTHOPAEDIC SURGERY

## 2024-04-22 NOTE — PROGRESS NOTES
CC: Follow-up status post left distal biceps repair-3/22/2024    Interval history: Patient returns to the clinic today for follow-up evaluation in regard to his left elbow.    Currently, the patient's left elbow is in a satisfactory condition. He reports a mild tingling sensation over the medial aspect of his proximal forearm but denies any numbness or tingling elsewhere in his arm. He also denies any complications with his incision. The patient is currently engaged in physical therapy, demonstrating significant progress in his range of motion. He denies any systemic symptoms such as fevers, chills, or sweats, and reports no issues with his incision.    Exam:    Physical Exam    General: No acute distress.  Resp: normal respiratory effort  Skin: no rashes or wounds; normal turgor  Psych: mood and affect appropriate; recent and remote memory intact.    Left elbow:   Active and passive range of motion in the left elbow is 5 to 140 degrees, with 4 out of 5 strength.  The patient is able to obtain supination and pronation to 85 degrees in each direction.   Good continuity of the biceps tendon is palpated with a negative hook test on light resisted supination.   The incision on the left elbow is well healed. Sensation to light touch is intact in all distributions of the left hand, arm, and forearm, symmetric to the right.   Brisk cap refill, 2+ radial pulse in the left arm.      Imaging:  3 view x-rays left elbow from today's visit-AP, lateral, oblique views, ordered reviewed by me, indication status post distal biceps repair, compared to intraoperative x-rays indicate stable position of fixation button for distal biceps repair.  No evidence of reactive bone formation or heterotopic ossification.      Impression: Status post left distal biceps repair    Plan:    1.Discussed treatment options at length with patient at today's visit. patient is doing well at this point in time. He can limit his brace utilization to any  active exercise or activities otherwise, he can be out of it intermittently to work on soft tissue massage and additional motion.   2. Continue with physical therapy per protocol for distal biceps rupture.  3. Follow up in 4 weeks for reassessment or sooner if needed. No x-rays needed. All questions answered.        Transcribed from ambient dictation for Armando Bang MD by Agnes Laguerre.   04/22/24   13:45 EDT    Patient or patient representative verbalized consent to the visit recording.  I have personally performed the services described in this document as transcribed by the above individual, and it is both accurate and complete.

## 2024-05-20 ENCOUNTER — OFFICE VISIT (OUTPATIENT)
Dept: ORTHOPEDIC SURGERY | Facility: CLINIC | Age: 34
End: 2024-05-20
Payer: COMMERCIAL

## 2024-05-20 VITALS — HEIGHT: 74 IN | WEIGHT: 268 LBS | BODY MASS INDEX: 34.39 KG/M2

## 2024-05-20 DIAGNOSIS — Z09 STATUS POST ORTHOPEDIC SURGERY, FOLLOW-UP EXAM: Primary | ICD-10-CM

## 2024-05-20 DIAGNOSIS — S46.212A RUPTURE OF LEFT DISTAL BICEPS TENDON, INITIAL ENCOUNTER: ICD-10-CM

## 2024-05-20 PROCEDURE — 99024 POSTOP FOLLOW-UP VISIT: CPT | Performed by: ORTHOPAEDIC SURGERY

## 2024-05-20 NOTE — PROGRESS NOTES
CC: Follow-up status post left distal biceps repair-3/22/2024     Interval history: Patient returns to the clinic today for follow-up evaluation in regard to his left elbow.     The patient returns to clinic today for follow-up evaluation in regards to his left elbow.    He reports a significant improvement in his condition, with a notable enhancement in his motion, strength, and functionality. He negates experiencing any numbness, tingling, locking, catching, buckling, or instability. He also denies any complications with his surgical incision and reports no numbness or tingling in his left forearm.     Exam:     Physical Exam     General: No acute distress.  Resp: normal respiratory effort  Skin: no rashes or wounds; normal turgor  Psych: mood and affect appropriate; recent and remote memory intact.     Left elbow:   Left elbow exam  The left elbow has an active range of motion from 0 to 150 degrees, with 4 plus out of 5 strength on flexion and extension.  The elbow is stable to varus and valgus stress at 0 and 30 degrees.   The volar incision on the left elbow is well healed.   The biceps tendon is palpated to be in continuity with a negative hook test.   Supination and pronation are 90 degrees in each direction with 4 plus out of 5 strength.   The left elbow has brisk cap refill in all digits, with a 2+ radial pulse in the left wrist.  Sensation to light touch in the left hand and forearm is positive in all distributions.         Impression: Status post left distal biceps repair     Plan:  Patient is doing well at this point in time. We will continue to work with physical therapy on his strengthening.   Told him to avoid heavy duty resisted supination activities.   I'll see him back in 2 months, repeat x-rays, left elbow or sooner if needed. All questions answered.    Transcribed from ambient dictation for Armando Bang MD by Agnes Laguerre.  05/20/24   10:51 EDT    Patient or patient representative  verbalized consent to the visit recording.  I have personally performed the services described in this document as transcribed by the above individual, and it is both accurate and complete.

## 2024-07-24 ENCOUNTER — OFFICE VISIT (OUTPATIENT)
Dept: ORTHOPEDIC SURGERY | Facility: CLINIC | Age: 34
End: 2024-07-24
Payer: COMMERCIAL

## 2024-07-24 VITALS — BODY MASS INDEX: 34.39 KG/M2 | HEIGHT: 74 IN | WEIGHT: 268 LBS

## 2024-07-24 DIAGNOSIS — Z09 STATUS POST ORTHOPEDIC SURGERY, FOLLOW-UP EXAM: Primary | ICD-10-CM

## 2024-07-24 PROCEDURE — 73080 X-RAY EXAM OF ELBOW: CPT | Performed by: ORTHOPAEDIC SURGERY

## 2024-07-24 PROCEDURE — 99212 OFFICE O/P EST SF 10 MIN: CPT | Performed by: ORTHOPAEDIC SURGERY

## 2024-07-24 NOTE — PROGRESS NOTES
"Subjective:     Patient ID: Sebastian Brito is a 33 y.o. male.    Chief Complaint:  Follow-up status post left distal biceps repair-3/22/2024     History of Present Illness  History of Present Illness  The patient returns to clinic today for follow-up evaluation in regards to his left elbow.    The patient reports a significant improvement in his condition, denying any instances of numbness, tingling, fevers, chills, or sweats. He also denies any complications with his incision. He has observed an improvement in his motion, strength, and function.     Social History     Occupational History    Not on file   Tobacco Use    Smoking status: Never     Passive exposure: Past    Smokeless tobacco: Current     Types: Chew    Tobacco comments:     Nicotine pouches - previously used dip   Vaping Use    Vaping status: Never Used   Substance and Sexual Activity    Alcohol use: Yes     Alcohol/week: 1.0 - 2.0 standard drink of alcohol     Types: 1 - 2 Shots of liquor per week     Comment: occ    Drug use: Never    Sexual activity: Yes     Partners: Female     Birth control/protection: None      History reviewed. No pertinent past medical history.  Past Surgical History:   Procedure Laterality Date    BICEPS TENDON REPAIR Left 3/22/2024    Procedure: TENDON DISTAL BICEPS REPAIR/RELEASE;  Surgeon: Armando Bang MD;  [facility];  Service: Orthopedics;  Laterality: Left;    INNER EAR SURGERY Bilateral     tubes       Family History   Problem Relation Age of Onset    Anesthesia problems Mother         sensitivity to succinylcholine         Review of Systems        Objective:  Vitals:    07/24/24 0801   Weight: 122 kg (268 lb)   Height: 188 cm (74\")         07/24/24  0801   Weight: 122 kg (268 lb)     Body mass index is 34.41 kg/m².  General: No acute distress.  Resp: normal respiratory effort  Skin: no rashes or wounds; normal turgor  Psych: mood and affect appropriate; recent and remote memory intact        "   Physical Exam  Left elbow has an active range of motion from 0 to 150 degrees. Strength is 5 out of 5 on flexion and extension, 4+ out of 5 strength on resisted supination, and 5 out of 5 on resisted pronation. Biceps tendon is palpated to be in continuity with a negative hook test. Incision is well healed. It is stable to varus and valgus at 0 and 30 degrees.         Imaging:  Three-view x-rays left elbow from today's visit AP lateral and oblique views, ordered and reviewed by me, indication status post distal biceps repair, compared to previous x-rays from office indicate stable position and alignment of tunnel in the radial tuberosity with secure fixation from cortical button.  No evidence of reactive bone formation.    Assessment:        1. Status post orthopedic surgery, follow-up exam           Plan:          Assessment & Plan  1. Left elbow follow-up.  The patient's condition has shown significant improvement. The patient is advised to continue focusing on range of motion, strength, and function. There are no restrictions at this juncture, however, it is advised that he gradually revert to any resisted activities. He concurs with this plan and all his queries were addressed.    Sebastian GOMEZ Brito was in agreement with plan and had all questions answered.     Orders:  Orders Placed This Encounter   Procedures    XR Elbow 3+ View Left       Medications:  No orders of the defined types were placed in this encounter.      Followup:  No follow-ups on file.    Diagnoses and all orders for this visit:    1. Status post orthopedic surgery, follow-up exam (Primary)  -     XR Elbow 3+ View Left          Dictated utilizing Dragon dictation     Patient or patient representative verbalized consent for the use of Ambient Listening during the visit with  Armando Bang MD for chart documentation. 7/24/2024  08:12 EDT

## (undated) DEVICE — SYS CLS SKIN PREMIERPRO EXOFINFUSION 22CM

## (undated) DEVICE — GLV SURG SENSICARE PI LF PF 7.0

## (undated) DEVICE — SUT VIC 3/0 SH CR8 18IN J864D

## (undated) DEVICE — DRAPE,REIN 53X77,STERILE: Brand: MEDLINE

## (undated) DEVICE — FOAM BUMP ROUND LARGE: Brand: MEDLINE INDUSTRIES, INC.

## (undated) DEVICE — Device

## (undated) DEVICE — APPL CHLORAPREP HI/LITE 26ML ORNG

## (undated) DEVICE — DISPOSABLE BIPOLAR CODE, 12' (3.66 M): Brand: CONMED

## (undated) DEVICE — DRP SURG U/DRP INVISISHIELD PA 48X52IN

## (undated) DEVICE — SOL IRR H2O BTL 1000ML STRL

## (undated) DEVICE — GLV SURG SENSICARE PF POLYISPRN SZ8 LF

## (undated) DEVICE — BNDG ELAS ELITE V/CLOSE 4IN 5YD LF

## (undated) DEVICE — INTENDED USE FOR SURGICAL MARKING ON INTACT SKIN, ALSO PROVIDES A PERMANENT METHOD OF IDENTIFYING OBJECTS IN THE OPERATING ROOM: Brand: WRITESITE® REGULAR TIP SKIN MARKER

## (undated) DEVICE — PAD,NON-ADHERENT,3X8,STERILE,LF,1/PK: Brand: MEDLINE

## (undated) DEVICE — SPLNT SCOTCHCAST QUICKSTEP DBL/SD/FELT FIBRGLS 4X30IN WHT

## (undated) DEVICE — SOL ISO/ALC RUB 70PCT 4OZ

## (undated) DEVICE — TBG PENCL TELESCP MEGADYNE SMOKE EVAC 10FT

## (undated) DEVICE — PADDING,UNDERCAST,COTTON, 4"X4YD STERILE: Brand: MEDLINE

## (undated) DEVICE — SPNG GZ WOVN 4X4IN 12PLY 10/BX STRL

## (undated) DEVICE — FRAZIER SUCTION INSTRUMENT 10 FR W/CONTROL VENT & OBTURATOR: Brand: FRAZIER

## (undated) DEVICE — BANDAGE,ELASTIC,ESMARK,STERILE,4"X9',LF: Brand: MEDLINE

## (undated) DEVICE — CVR C/ARM MINI

## (undated) DEVICE — PK BASIC ORTHO 90

## (undated) DEVICE — GLV SURG SENSICARE PI PF LF 7 GRN STRL